# Patient Record
Sex: FEMALE
[De-identification: names, ages, dates, MRNs, and addresses within clinical notes are randomized per-mention and may not be internally consistent; named-entity substitution may affect disease eponyms.]

---

## 2018-09-17 ENCOUNTER — HOSPITAL ENCOUNTER (EMERGENCY)
Dept: HOSPITAL 14 - H.ER | Age: 43
Discharge: HOME | End: 2018-09-17
Payer: COMMERCIAL

## 2018-09-17 VITALS
SYSTOLIC BLOOD PRESSURE: 98 MMHG | TEMPERATURE: 97.8 F | OXYGEN SATURATION: 100 % | RESPIRATION RATE: 18 BRPM | DIASTOLIC BLOOD PRESSURE: 67 MMHG | HEART RATE: 76 BPM

## 2018-09-17 DIAGNOSIS — Z88.0: ICD-10-CM

## 2018-09-17 DIAGNOSIS — N64.4: Primary | ICD-10-CM

## 2018-09-17 NOTE — ED PDOC
HPI: General Adult


Time Seen by Provider: 09/17/18 10:11


Chief Complaint (Nursing): Chest Pain


History Per: Patient


Onset/Duration Of Symptoms: Days (1)


Current Symptoms Are (Timing): Still Present


Severity: Mild


Additional Complaint(s): 





Sudden sharp right sided breast pain since last night. Denies trauma. No 

swelling redness discharge or fever.





Past Medical History


Vital Signs: 





 Last Vital Signs











Temp  98 F   09/17/18 10:09


 


Pulse  88   09/17/18 10:09


 


Resp  20   09/17/18 10:09


 


BP  90/53 L  09/17/18 10:09


 


Pulse Ox  98   09/17/18 10:09














- Medical History


PMH: No Chronic Diseases





- Family History


Family History: States: Unknown Family Hx





- Home Medications


Home Medications: 


 Ambulatory Orders











 Medication  Instructions  Recorded


 


Ibuprofen [Motrin Tab] 800 mg PO TID PRN #30 tab 06/23/15


 


Naproxen [Naprosyn] 500 mg PO Q12H #20 tab 09/17/18














- Allergies


Allergies/Adverse Reactions: 


 Allergies











Allergy/AdvReac Type Severity Reaction Status Date / Time


 


Penicillins Allergy Mild RASH Verified 09/17/18 10:09














Review of Systems


Constitutional: Negative for: Fever


Genitourinary Female: Positive for: Other (Breast pain)





Physical Exam





- Physical Exam


Appears: Positive for: Non-toxic, No Acute Distress


Skin: Positive for: Normal Color, Warm, DRY


Cardiovascular/Chest: Positive for: Other (Breast Right. No redness, no dimpling

, no masses, no tenderness, no discharge from nipple. No axillary 

lymphadenopathy. Left breast No masses, nl)





- ECG


O2 Sat by Pulse Oximetry: 98





Disposition





- Clinical Impression


Clinical Impression: 


 Breast pain








- Patient ED Disposition


Is Patient to be Admitted: No


Counseled Patient/Family Regarding: Diagnosis, Need For Followup, Rx Given





- Disposition


Disposition: Routine/Home


Disposition Time: 10:43


Condition: FAIR


Prescriptions: 


Naproxen [Naprosyn] 500 mg PO Q12H #20 tab


Instructions:  Common Breast Problems, Mastalgia (DC)